# Patient Record
Sex: MALE | Race: WHITE | NOT HISPANIC OR LATINO | Employment: UNEMPLOYED | URBAN - METROPOLITAN AREA
[De-identification: names, ages, dates, MRNs, and addresses within clinical notes are randomized per-mention and may not be internally consistent; named-entity substitution may affect disease eponyms.]

---

## 2022-02-01 ENCOUNTER — OFFICE VISIT (OUTPATIENT)
Dept: FAMILY MEDICINE CLINIC | Facility: CLINIC | Age: 32
End: 2022-02-01

## 2022-02-01 VITALS
DIASTOLIC BLOOD PRESSURE: 84 MMHG | BODY MASS INDEX: 23.99 KG/M2 | WEIGHT: 162 LBS | HEIGHT: 69 IN | SYSTOLIC BLOOD PRESSURE: 118 MMHG | HEART RATE: 76 BPM | TEMPERATURE: 97.5 F

## 2022-02-01 DIAGNOSIS — I99.8 ISCHEMIA OF DIGITS OF HAND: ICD-10-CM

## 2022-02-01 DIAGNOSIS — F11.11: ICD-10-CM

## 2022-02-01 DIAGNOSIS — M79.641 RIGHT HAND PAIN: ICD-10-CM

## 2022-02-01 DIAGNOSIS — M79.672 LEFT FOOT PAIN: Primary | ICD-10-CM

## 2022-02-01 PROBLEM — Z72.0 TOBACCO USE: Status: ACTIVE | Noted: 2022-01-27

## 2022-02-01 PROBLEM — Z21 HIV ANTIBODY POSITIVE: Status: ACTIVE | Noted: 2022-01-27

## 2022-02-01 PROBLEM — B19.20 HEPATITIS C VIRUS INFECTION WITHOUT HEPATIC COMA: Status: ACTIVE | Noted: 2022-01-27

## 2022-02-01 PROCEDURE — 99203 OFFICE O/P NEW LOW 30 MIN: CPT | Performed by: FAMILY MEDICINE

## 2022-02-01 RX ORDER — BUPRENORPHINE AND NALOXONE 8; 2 MG/1; MG/1
FILM, SOLUBLE BUCCAL; SUBLINGUAL
COMMUNITY
Start: 2022-01-21

## 2022-02-01 NOTE — PROGRESS NOTES
"Subjective   Sahil Cruz is a 31 y.o. male.     History of Present Illness     He complains of history of \"nerve damage to his foot\"  He was on gabap[place] in the past, [place]  He has had some right hand issue as well and has seen ER on multiple occasions  He has some blood flow issues and is seeing hand specialist for this  He complains of right thumb pain as well   Seeing hand specialist for this and they expect hand pain to improve with time      He sees bright y[facility]uf for his suboxone, Pasquale Najera  [facility] adjacent to [place]      The following portions of the patient's history were reviewed and updated as appropriate: allergies, current medications, past family history, past medical history, past social history, past surgical history and problem list.    Review of Systems   Constitutional: Negative.    Respiratory: Negative.  Negative for shortness of breath.    Cardiovascular: Negative.  Negative for chest pain.   Psychiatric/Behavioral: Negative.        Objective   Physical Exam  Vitals and nursing note reviewed.   Constitutional:       General: He is not in acute distress.     Appearance: Normal appearance. He is well-developed.   Cardiovascular:      Rate and Rhythm: Normal rate and regular rhythm.      Heart sounds: Normal heart sounds.   Pulmonary:      Effort: Pulmonary effort is normal.      Breath sounds: Normal breath sounds.   Musculoskeletal:        Hands:    Neurological:      Mental Status: He is alert and oriented to person, place, and time.   Psychiatric:         Mood and Affect: Mood normal.         Behavior: Behavior normal.         Thought Content: Thought content normal.         Judgment: Judgment normal.         Assessment/Plan   Diagnoses and all orders for this visit:    1. Left foot pain (Primary)    2. Right hand pain    3. Opioid use disorder, mild, in sustained remission, on maintenance therapy, abuse (HCC)    4. Ischemia of digits of " hand    pt is requesting neurontin.  He has not been on it in a while and is currently taking suboxone.  I asked him for some time to consider this medicine.  After a couple days of thinking about it, I simply do not feel comfortable prescribing neurontin due to its interaction with suboxone.  His suboxone clinic can prescribe it if they feel comfortable or I can place referral to pain clinic to discuss this medicine. Message sent to pt about this and I will await his response to see if referral would be needed.    He will continue to see hand specialist for his thumb.  I reviewed their note and the hand specialist at  also did not prescribe him any neurontin and told him the pain would resolve with time.

## 2022-02-04 ENCOUNTER — TELEPHONE (OUTPATIENT)
Dept: FAMILY MEDICINE CLINIC | Facility: CLINIC | Age: 32
End: 2022-02-04

## 2022-02-04 NOTE — TELEPHONE ENCOUNTER
Can we go ahead and dismiss this pt.   We simply don't want patients who speak to staff in this type of manner nor speak about me the way he did.  Thanks.

## 2022-02-04 NOTE — TELEPHONE ENCOUNTER
Pt had asked about starting neurontin while he is also on suboxone.  I simply do not feel comfortable prescribing this medicine due to its interaction with suboxone.    His suboxone clinic can prescribe it if they feel comfortable or I can place referral to pain clinic to discuss this medicine.  I know this is not the answer he wanted but I do not feel that the combination is a good one and so will not be prescribing it.  Let me know if a referral is requested.

## 2022-02-04 NOTE — TELEPHONE ENCOUNTER
Informed pt and he said, he would just find himself another doctor and became very belligerent. (Will not repeat what pt stated)